# Patient Record
(demographics unavailable — no encounter records)

---

## 2024-11-13 NOTE — DISCUSSION/SUMMARY
[FreeTextEntry1] : In summary the patient is a 57-year-old woman with cardiac sarcoidosis, heart block and cardiomyopathy.  She is status post pacemaker that was upgraded to a defibrillator and ultimately biventricular ICD.  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  Her device is approaching LAURIE.  In addition she has chronic phrenic stimulation regardless of reprogramming.  We discussed all the potential treatment options.  She could simply undergo a generator change, however she would then live with phrenic stimulation and a potentially failing ICD lead.  If one of the portions of the ICD lead has already failed it is unclear how stable the remaining conductors might be.  Adding a lead on the same side if the vein was patent or on the contralateral side and tunneling if it was not is an additional option.  However this would only deal with the ICD lead issue and not the phrenic stimulation.  Also the patient already has 4 leads in her SVC and adding an additional lead would increase her risk of SVC syndrome.  An alternative would be extraction of her existing system with placement of a new BiV ICD.  The hope would be that we could find a different LV lead position to avoid phrenic stimulation and or place a 3830 physiologic pacing lead.  The procedures, outcomes and risks of extraction were discussed in detail.  The risks discussed included but were not limited to, bleeding, infection, AV fistula, vascular tear, valvular damage, cardiac tear, need for emergent surgery and death.  After all questions were answered from the patient and her  she would like to proceed with extraction and reimplantation.  Reviewing all the records and our telephone conversation accounted for 45 minutes.

## 2024-11-13 NOTE — REASON FOR VISIT
[Home] : at home, [unfilled] , at the time of the visit. [Medical Office: ([facility])___] : at the medical office located in  [Spouse] : spouse [Patient] : the patient

## 2024-11-13 NOTE — HISTORY OF PRESENT ILLNESS
[FreeTextEntry1] : I had the pleasure of seeing your patient Alesha Bull today in the arrhythmia clinic of [facility] via telehealth.  As you well know the patient is a delightful 57-year-old woman with a history of cardiac and pulmonary sarcoidosis.  The patient had presented with heart block in 2006 and underwent initial pacemaker implantation.  When she was diagnosed with cardiac sarcoidosis, she was upgraded to a defibrillator in 2010 with abandonment of her RV pacing lead.  Due to a fall in her ejection fraction, according to the patient to the high 20s, she underwent an upgrade to a BiV ICD in 2016.  This has led to a significant improvement in her ejection fraction.  In May 2023 MUGA scan demonstrated an EF of 56%.  An echocardiogram on December 1, 2023, demonstrated an ejection fraction of 47% with normal left atrial size, normal RV and mild mitral regurgitation.  The patient did undergo a PVC ablation in 2019.  The full details of this procedure are not known to me except that 1 foci was in the right ventricle and 1 in the left.  She does continue to have PVCs on ECG and on device monitoring.  The patient has however had issues with her device.  She has had intermittent phrenic stimulation despite attempts at reprogramming.  She states she probably has 20-30 episodes a day and is quite annoying.  She is grateful that her ejection fraction has improved with BiV pacing and is trying to live with it given the benefit.  In addition, more recently the patient has developed an SVC coil fracture the SVC coil has been deactivated.  She is a now approaching LAURIE and has sought multiple opinions on how to manage her device related issues.  The patient did go to the [facility] where a CT scan was performed.  There is a question whether the RV ICD lead tip had perforated but it is often very difficult to tell on CT scan given the artifact that the tip of the lead creates.  The patient's current system includes 4086 leads in the right atrium and the right ventricle, with the RV lead being capped a 6944 tined lead in the right ventricle and a 4598 CS lead.  Today on the call the patient reports overall doing well.  She remains active and is planning on retiring soon.  She denies any palpitations lightheadedness presyncope or syncope.